# Patient Record
Sex: MALE | Race: WHITE | Employment: STUDENT | URBAN - METROPOLITAN AREA
[De-identification: names, ages, dates, MRNs, and addresses within clinical notes are randomized per-mention and may not be internally consistent; named-entity substitution may affect disease eponyms.]

---

## 2018-11-19 ENCOUNTER — APPOINTMENT (OUTPATIENT)
Dept: CT IMAGING | Age: 18
DRG: 395 | End: 2018-11-19
Payer: COMMERCIAL

## 2018-11-19 ENCOUNTER — HOSPITAL ENCOUNTER (INPATIENT)
Age: 18
LOS: 1 days | Discharge: HOME | DRG: 395 | End: 2018-11-20
Attending: EMERGENCY MEDICINE | Admitting: SURGERY
Payer: COMMERCIAL

## 2018-11-19 DIAGNOSIS — K66.1 HEMOPERITONEUM: ICD-10-CM

## 2018-11-19 DIAGNOSIS — R93.5 ABNORMAL CT OF THE ABDOMEN: ICD-10-CM

## 2018-11-19 DIAGNOSIS — R10.31 ABDOMINAL PAIN, RIGHT LOWER QUADRANT: Primary | ICD-10-CM

## 2018-11-19 LAB
A/G RATIO: 1.6 (ref 1.1–2.2)
ABO/RH: NORMAL
ALBUMIN SERPL-MCNC: 4.2 G/DL (ref 3.4–5)
ALP BLD-CCNC: 48 U/L (ref 40–129)
ALT SERPL-CCNC: 30 U/L (ref 10–40)
ANION GAP SERPL CALCULATED.3IONS-SCNC: 8 MMOL/L (ref 3–16)
ANTIBODY SCREEN: NORMAL
AST SERPL-CCNC: 21 U/L (ref 15–37)
BASOPHILS ABSOLUTE: 0 K/UL (ref 0–0.2)
BASOPHILS RELATIVE PERCENT: 0.4 %
BILIRUB SERPL-MCNC: 0.6 MG/DL (ref 0–1)
BILIRUBIN URINE: NEGATIVE
BLOOD, URINE: NEGATIVE
BUN BLDV-MCNC: 8 MG/DL (ref 7–20)
CALCIUM SERPL-MCNC: 8.9 MG/DL (ref 8.3–10.6)
CHLORIDE BLD-SCNC: 103 MMOL/L (ref 99–110)
CLARITY: CLEAR
CO2: 28 MMOL/L (ref 21–32)
COLOR: YELLOW
CREAT SERPL-MCNC: 0.7 MG/DL (ref 0.9–1.3)
EOSINOPHILS ABSOLUTE: 0.2 K/UL (ref 0–0.6)
EOSINOPHILS RELATIVE PERCENT: 2.9 %
GFR AFRICAN AMERICAN: >60
GFR NON-AFRICAN AMERICAN: >60
GLOBULIN: 2.6 G/DL
GLUCOSE BLD-MCNC: 96 MG/DL (ref 70–99)
GLUCOSE URINE: NEGATIVE MG/DL
HCT VFR BLD CALC: 31.9 % (ref 40.5–52.5)
HEMOGLOBIN: 11.1 G/DL (ref 13.5–17.5)
KETONES, URINE: NEGATIVE MG/DL
LEUKOCYTE ESTERASE, URINE: NEGATIVE
LIPASE: 20 U/L (ref 13–60)
LYMPHOCYTES ABSOLUTE: 1.6 K/UL (ref 1–5.1)
LYMPHOCYTES RELATIVE PERCENT: 26.5 %
MCH RBC QN AUTO: 31.5 PG (ref 26–34)
MCHC RBC AUTO-ENTMCNC: 34.7 G/DL (ref 31–36)
MCV RBC AUTO: 90.9 FL (ref 80–100)
MICROSCOPIC EXAMINATION: ABNORMAL
MONOCYTES ABSOLUTE: 0.6 K/UL (ref 0–1.3)
MONOCYTES RELATIVE PERCENT: 9.2 %
NEUTROPHILS ABSOLUTE: 3.7 K/UL (ref 1.7–7.7)
NEUTROPHILS RELATIVE PERCENT: 61 %
NITRITE, URINE: NEGATIVE
PDW BLD-RTO: 13 % (ref 12.4–15.4)
PH UA: 8.5
PLATELET # BLD: 172 K/UL (ref 135–450)
PMV BLD AUTO: 7.6 FL (ref 5–10.5)
POTASSIUM SERPL-SCNC: 4.1 MMOL/L (ref 3.5–5.1)
PROTEIN UA: NEGATIVE MG/DL
RBC # BLD: 3.51 M/UL (ref 4.2–5.9)
SODIUM BLD-SCNC: 139 MMOL/L (ref 136–145)
SPECIFIC GRAVITY UA: 1.01
TOTAL PROTEIN: 6.8 G/DL (ref 6.4–8.2)
URINE TYPE: ABNORMAL
UROBILINOGEN, URINE: 0.2 E.U./DL
WBC # BLD: 6.1 K/UL (ref 4–11)

## 2018-11-19 PROCEDURE — 86900 BLOOD TYPING SEROLOGIC ABO: CPT

## 2018-11-19 PROCEDURE — 36415 COLL VENOUS BLD VENIPUNCTURE: CPT

## 2018-11-19 PROCEDURE — 6370000000 HC RX 637 (ALT 250 FOR IP): Performed by: SURGERY

## 2018-11-19 PROCEDURE — 6360000004 HC RX CONTRAST MEDICATION: Performed by: EMERGENCY MEDICINE

## 2018-11-19 PROCEDURE — 85025 COMPLETE CBC W/AUTO DIFF WBC: CPT

## 2018-11-19 PROCEDURE — 1200000000 HC SEMI PRIVATE

## 2018-11-19 PROCEDURE — 83690 ASSAY OF LIPASE: CPT

## 2018-11-19 PROCEDURE — 99223 1ST HOSP IP/OBS HIGH 75: CPT | Performed by: SURGERY

## 2018-11-19 PROCEDURE — 81003 URINALYSIS AUTO W/O SCOPE: CPT

## 2018-11-19 PROCEDURE — 86901 BLOOD TYPING SEROLOGIC RH(D): CPT

## 2018-11-19 PROCEDURE — 99285 EMERGENCY DEPT VISIT HI MDM: CPT

## 2018-11-19 PROCEDURE — 86850 RBC ANTIBODY SCREEN: CPT

## 2018-11-19 PROCEDURE — 93005 ELECTROCARDIOGRAM TRACING: CPT | Performed by: EMERGENCY MEDICINE

## 2018-11-19 PROCEDURE — 2580000003 HC RX 258: Performed by: SURGERY

## 2018-11-19 PROCEDURE — 80053 COMPREHEN METABOLIC PANEL: CPT

## 2018-11-19 PROCEDURE — 74177 CT ABD & PELVIS W/CONTRAST: CPT

## 2018-11-19 RX ORDER — SODIUM CHLORIDE 9 MG/ML
INJECTION, SOLUTION INTRAVENOUS CONTINUOUS
Status: DISCONTINUED | OUTPATIENT
Start: 2018-11-19 | End: 2018-11-20 | Stop reason: HOSPADM

## 2018-11-19 RX ORDER — PROMETHAZINE HYDROCHLORIDE 25 MG/ML
6.25 INJECTION, SOLUTION INTRAMUSCULAR; INTRAVENOUS EVERY 6 HOURS PRN
Status: DISCONTINUED | OUTPATIENT
Start: 2018-11-19 | End: 2018-11-20 | Stop reason: HOSPADM

## 2018-11-19 RX ORDER — ONDANSETRON 2 MG/ML
4 INJECTION INTRAMUSCULAR; INTRAVENOUS EVERY 6 HOURS PRN
Status: DISCONTINUED | OUTPATIENT
Start: 2018-11-19 | End: 2018-11-20 | Stop reason: HOSPADM

## 2018-11-19 RX ORDER — OXYCODONE HYDROCHLORIDE 5 MG/1
10 TABLET ORAL EVERY 4 HOURS PRN
Status: DISCONTINUED | OUTPATIENT
Start: 2018-11-19 | End: 2018-11-20 | Stop reason: HOSPADM

## 2018-11-19 RX ORDER — OXYCODONE HYDROCHLORIDE 5 MG/1
5 TABLET ORAL EVERY 4 HOURS PRN
Status: DISCONTINUED | OUTPATIENT
Start: 2018-11-19 | End: 2018-11-20 | Stop reason: HOSPADM

## 2018-11-19 RX ORDER — METHOCARBAMOL 750 MG/1
750 TABLET, FILM COATED ORAL
COMMUNITY
Start: 2018-11-17

## 2018-11-19 RX ORDER — ACETAMINOPHEN 325 MG/1
650 TABLET ORAL EVERY 6 HOURS PRN
Status: DISCONTINUED | OUTPATIENT
Start: 2018-11-19 | End: 2018-11-20 | Stop reason: HOSPADM

## 2018-11-19 RX ORDER — NAPROXEN 375 MG/1
375 TABLET ORAL
Status: ON HOLD | COMMUNITY
Start: 2018-11-18 | End: 2018-11-20 | Stop reason: HOSPADM

## 2018-11-19 RX ORDER — HYDROMORPHONE HCL 110MG/55ML
0.5 PATIENT CONTROLLED ANALGESIA SYRINGE INTRAVENOUS
Status: DISCONTINUED | OUTPATIENT
Start: 2018-11-19 | End: 2018-11-20 | Stop reason: HOSPADM

## 2018-11-19 RX ADMIN — IOHEXOL 50 ML: 240 INJECTION, SOLUTION INTRATHECAL; INTRAVASCULAR; INTRAVENOUS; ORAL at 20:11

## 2018-11-19 RX ADMIN — OXYCODONE HYDROCHLORIDE 5 MG: 5 TABLET ORAL at 23:35

## 2018-11-19 RX ADMIN — IOPAMIDOL 75 ML: 755 INJECTION, SOLUTION INTRAVENOUS at 21:14

## 2018-11-19 RX ADMIN — SODIUM CHLORIDE: 9 INJECTION, SOLUTION INTRAVENOUS at 23:35

## 2018-11-19 RX ADMIN — SODIUM CHLORIDE: 9 INJECTION, SOLUTION INTRAVENOUS at 23:34

## 2018-11-19 ASSESSMENT — PAIN DESCRIPTION - FREQUENCY: FREQUENCY: CONTINUOUS

## 2018-11-19 ASSESSMENT — PAIN DESCRIPTION - DESCRIPTORS: DESCRIPTORS: ACHING

## 2018-11-19 ASSESSMENT — ENCOUNTER SYMPTOMS
SORE THROAT: 0
EYE REDNESS: 0
VOMITING: 0
BLOOD IN STOOL: 0
PHOTOPHOBIA: 0
TROUBLE SWALLOWING: 0
EYE ITCHING: 0
BACK PAIN: 0
ABDOMINAL DISTENTION: 0
SHORTNESS OF BREATH: 0
DIARRHEA: 0
RHINORRHEA: 0
EYE PAIN: 0
FACIAL SWELLING: 0
COUGH: 0
VOICE CHANGE: 0
STRIDOR: 0
ANAL BLEEDING: 0
NAUSEA: 1
EYE DISCHARGE: 0
CHEST TIGHTNESS: 0
ABDOMINAL PAIN: 1
WHEEZING: 0
SINUS PRESSURE: 0
CONSTIPATION: 1

## 2018-11-19 ASSESSMENT — PAIN DESCRIPTION - PAIN TYPE
TYPE: ACUTE PAIN
TYPE: ACUTE PAIN

## 2018-11-19 ASSESSMENT — PAIN SCALES - GENERAL
PAINLEVEL_OUTOF10: 7
PAINLEVEL_OUTOF10: 0
PAINLEVEL_OUTOF10: 9

## 2018-11-19 ASSESSMENT — PAIN DESCRIPTION - ORIENTATION: ORIENTATION: RIGHT;LEFT;MID

## 2018-11-19 ASSESSMENT — PAIN DESCRIPTION - LOCATION: LOCATION: ABDOMEN

## 2018-11-20 VITALS
SYSTOLIC BLOOD PRESSURE: 124 MMHG | RESPIRATION RATE: 16 BRPM | HEIGHT: 76 IN | OXYGEN SATURATION: 98 % | BODY MASS INDEX: 26.18 KG/M2 | DIASTOLIC BLOOD PRESSURE: 67 MMHG | WEIGHT: 215 LBS | HEART RATE: 80 BPM | TEMPERATURE: 98.4 F

## 2018-11-20 LAB
ANION GAP SERPL CALCULATED.3IONS-SCNC: 9 MMOL/L (ref 3–16)
BASOPHILS ABSOLUTE: 0 K/UL (ref 0–0.2)
BASOPHILS RELATIVE PERCENT: 0.5 %
BUN BLDV-MCNC: 8 MG/DL (ref 7–20)
CALCIUM SERPL-MCNC: 8.5 MG/DL (ref 8.3–10.6)
CHLORIDE BLD-SCNC: 104 MMOL/L (ref 99–110)
CO2: 26 MMOL/L (ref 21–32)
CREAT SERPL-MCNC: 0.7 MG/DL (ref 0.9–1.3)
EOSINOPHILS ABSOLUTE: 0.1 K/UL (ref 0–0.6)
EOSINOPHILS RELATIVE PERCENT: 2.9 %
GFR AFRICAN AMERICAN: >60
GFR NON-AFRICAN AMERICAN: >60
GLUCOSE BLD-MCNC: 101 MG/DL (ref 70–99)
HCT VFR BLD CALC: 29.9 % (ref 40.5–52.5)
HEMOGLOBIN: 10.5 G/DL (ref 13.5–17.5)
LYMPHOCYTES ABSOLUTE: 1.1 K/UL (ref 1–5.1)
LYMPHOCYTES RELATIVE PERCENT: 21 %
MCH RBC QN AUTO: 31.9 PG (ref 26–34)
MCHC RBC AUTO-ENTMCNC: 35.1 G/DL (ref 31–36)
MCV RBC AUTO: 90.7 FL (ref 80–100)
MONOCYTES ABSOLUTE: 0.4 K/UL (ref 0–1.3)
MONOCYTES RELATIVE PERCENT: 7.9 %
NEUTROPHILS ABSOLUTE: 3.4 K/UL (ref 1.7–7.7)
NEUTROPHILS RELATIVE PERCENT: 67.7 %
PDW BLD-RTO: 12.8 % (ref 12.4–15.4)
PLATELET # BLD: 155 K/UL (ref 135–450)
PMV BLD AUTO: 7.7 FL (ref 5–10.5)
POTASSIUM SERPL-SCNC: 4.1 MMOL/L (ref 3.5–5.1)
RBC # BLD: 3.3 M/UL (ref 4.2–5.9)
SODIUM BLD-SCNC: 139 MMOL/L (ref 136–145)
WBC # BLD: 5.1 K/UL (ref 4–11)

## 2018-11-20 PROCEDURE — 6360000002 HC RX W HCPCS: Performed by: SURGERY

## 2018-11-20 PROCEDURE — 85025 COMPLETE CBC W/AUTO DIFF WBC: CPT

## 2018-11-20 PROCEDURE — 99238 HOSP IP/OBS DSCHRG MGMT 30/<: CPT | Performed by: SURGERY

## 2018-11-20 PROCEDURE — 80048 BASIC METABOLIC PNL TOTAL CA: CPT

## 2018-11-20 PROCEDURE — 36415 COLL VENOUS BLD VENIPUNCTURE: CPT

## 2018-11-20 PROCEDURE — 93010 ELECTROCARDIOGRAM REPORT: CPT | Performed by: INTERNAL MEDICINE

## 2018-11-20 PROCEDURE — 6370000000 HC RX 637 (ALT 250 FOR IP): Performed by: SURGERY

## 2018-11-20 RX ORDER — ACETAMINOPHEN 325 MG/1
650 TABLET ORAL EVERY 4 HOURS PRN
Qty: 120 TABLET | Refills: 3 | COMMUNITY
Start: 2018-11-20

## 2018-11-20 RX ADMIN — HYDROMORPHONE HYDROCHLORIDE 0.5 MG: 2 INJECTION INTRAMUSCULAR; INTRAVENOUS; SUBCUTANEOUS at 03:45

## 2018-11-20 RX ADMIN — ACETAMINOPHEN 650 MG: 325 TABLET ORAL at 12:23

## 2018-11-20 RX ADMIN — HYDROMORPHONE HYDROCHLORIDE 0.5 MG: 2 INJECTION INTRAMUSCULAR; INTRAVENOUS; SUBCUTANEOUS at 06:51

## 2018-11-20 ASSESSMENT — PAIN SCALES - GENERAL
PAINLEVEL_OUTOF10: 7
PAINLEVEL_OUTOF10: 5
PAINLEVEL_OUTOF10: 6
PAINLEVEL_OUTOF10: 0
PAINLEVEL_OUTOF10: 4
PAINLEVEL_OUTOF10: 4

## 2018-11-20 NOTE — ED PROVIDER NOTES
Clary Kwon is a 21year old college student who presents with abdominal pain since Friday evening. 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 He was seen at [facility] in [facility], where he had a CT neck, which he reports was negative. On Saturday the abdominal pain became more severe, and he was seen at [facility], where he had plain films of the chest and abdomen, which he reports were negative. He saw his pediatrician today, who recommended a CT abdomen for possible appendicitis. He is anorexic, has nausea, but denies fever or vomiting. He is slightly constipated, but he has not eaten much since his symptoms started. He denies fever. /81   Pulse 78   Temp 98.2 °F (36.8 °C) (Oral)   Resp 14   Ht 6' 4\" (1.93 m)   Wt 215 lb (97.5 kg)   SpO2 100%   BMI 26.17 kg/m²     I have reviewed the following from the nursing documentation:      Prior to Admission medications    Not on File       Allergies as of 11/19/2018    (No Known Allergies)       Past Medical History:   Diagnosis Date    Pneumomediastinum [facility])         Surgical History: History reviewed. No pertinent surgical history. Family History:  History reviewed. No pertinent family history. Social History     Social History    Marital status: Single     Spouse name: N/A    Number of children: N/A    Years of education: N/A     Occupational History    Not on file. Social History Main Topics    Smoking status: Never Smoker    Smokeless tobacco: Never Used    Alcohol use No    Drug use: Unknown    Sexual activity: Not on file     Other Topics Concern    Not on file     Social History Narrative    No narrative on file         Review of Systems   Constitutional: Negative for activity change, appetite change, chills, diaphoresis, fatigue and fever.    HENT: Negative. Negative for congestion, dental problem, ear pain, facial swelling, rhinorrhea, sinus pressure, sneezing, sore throat, tinnitus, trouble swallowing and voice change. Eyes: Negative for photophobia, pain, discharge, redness, itching and visual disturbance. Respiratory: Negative for cough, chest tightness, shortness of breath, wheezing and stridor. Cardiovascular: Negative for chest pain, palpitations and leg swelling. Gastrointestinal: Positive for abdominal pain (RLQ), constipation and nausea. Negative for abdominal distention, anal bleeding, blood in stool, diarrhea and vomiting. Genitourinary: Negative for difficulty urinating, discharge, dysuria, frequency, hematuria, testicular pain and urgency. Musculoskeletal: Negative for back pain, joint swelling, neck pain and neck stiffness. Skin: Negative for rash and wound. Neurological: Negative for dizziness, syncope, facial asymmetry, speech difficulty, weakness, numbness and headaches. Hematological: Does not bruise/bleed easily. Psychiatric/Behavioral: Negative for agitation, confusion, hallucinations, self-injury, sleep disturbance and suicidal ideas. The patient is not nervous/anxious. All other systems reviewed and are negative. Physical Exam   Constitutional: He is oriented to person, place, and time. He appears well-developed and well-nourished. No distress. HENT:   Head: Normocephalic and atraumatic. Right Ear: External ear normal.   Left Ear: External ear normal.   Nose: Nose normal.   Mouth/Throat: Oropharynx is clear and moist. No oropharyngeal exudate. Eyes: Pupils are equal, round, and reactive to light. Conjunctivae and EOM are normal. Right eye exhibits no discharge. Left eye exhibits no discharge. No scleral icterus. Neck: Normal range of motion. Neck supple. No JVD present. No tracheal deviation present. Cardiovascular: Normal rate, regular rhythm, normal heart sounds and intact distal pulses.   Exam

## 2018-11-21 LAB
EKG ATRIAL RATE: 79 BPM
EKG DIAGNOSIS: NORMAL
EKG P AXIS: 42 DEGREES
EKG P-R INTERVAL: 150 MS
EKG Q-T INTERVAL: 352 MS
EKG QRS DURATION: 94 MS
EKG QTC CALCULATION (BAZETT): 403 MS
EKG R AXIS: 54 DEGREES
EKG T AXIS: 48 DEGREES
EKG VENTRICULAR RATE: 79 BPM

## 2018-11-23 DIAGNOSIS — K66.1 HEMOPERITONEUM: ICD-10-CM

## 2018-11-23 LAB
HCT VFR BLD CALC: 35 % (ref 40.5–52.5)
HEMOGLOBIN: 12.2 G/DL (ref 13.5–17.5)
MCH RBC QN AUTO: 32.1 PG (ref 26–34)
MCHC RBC AUTO-ENTMCNC: 34.8 G/DL (ref 31–36)
MCV RBC AUTO: 92.2 FL (ref 80–100)
PDW BLD-RTO: 13.2 % (ref 12.4–15.4)
PLATELET # BLD: 234 K/UL (ref 135–450)
PMV BLD AUTO: 8.4 FL (ref 5–10.5)
RBC # BLD: 3.79 M/UL (ref 4.2–5.9)
WBC # BLD: 6.6 K/UL (ref 4–11)